# Patient Record
Sex: FEMALE | Race: WHITE | NOT HISPANIC OR LATINO | Employment: UNEMPLOYED | URBAN - METROPOLITAN AREA
[De-identification: names, ages, dates, MRNs, and addresses within clinical notes are randomized per-mention and may not be internally consistent; named-entity substitution may affect disease eponyms.]

---

## 2022-04-11 ENCOUNTER — HISTORICAL (OUTPATIENT)
Dept: ADMINISTRATIVE | Facility: HOSPITAL | Age: 27
End: 2022-04-11

## 2022-04-24 VITALS
BODY MASS INDEX: 43.04 KG/M2 | SYSTOLIC BLOOD PRESSURE: 110 MMHG | DIASTOLIC BLOOD PRESSURE: 74 MMHG | HEIGHT: 67 IN | WEIGHT: 274.25 LBS

## 2022-11-28 RX ORDER — VALACYCLOVIR HYDROCHLORIDE 1 G/1
1000 TABLET, FILM COATED ORAL 2 TIMES DAILY
COMMUNITY

## 2022-11-28 RX ORDER — SERTRALINE HYDROCHLORIDE 100 MG/1
100 TABLET, FILM COATED ORAL DAILY
COMMUNITY

## 2022-12-06 ENCOUNTER — HOSPITAL ENCOUNTER (OUTPATIENT)
Facility: HOSPITAL | Age: 27
Discharge: HOME | End: 2022-12-06
Attending: OBSTETRICS & GYNECOLOGY | Admitting: OBSTETRICS & GYNECOLOGY
Payer: MEDICAID

## 2022-12-06 ENCOUNTER — ANESTHESIA EVENT (OUTPATIENT)
Dept: SURGERY | Facility: HOSPITAL | Age: 27
End: 2022-12-06
Payer: MEDICAID

## 2022-12-06 ENCOUNTER — ANESTHESIA (OUTPATIENT)
Dept: SURGERY | Facility: HOSPITAL | Age: 27
End: 2022-12-06
Payer: MEDICAID

## 2022-12-06 DIAGNOSIS — Z30.2 STERILIZATION: Primary | ICD-10-CM

## 2022-12-06 LAB
B-HCG UR QL: NEGATIVE
CTP QC/QA: YES

## 2022-12-06 PROCEDURE — 00851 ANES IPER PX TUBAL LIGATION: CPT | Performed by: OBSTETRICS & GYNECOLOGY

## 2022-12-06 PROCEDURE — 63600175 PHARM REV CODE 636 W HCPCS: Performed by: ANESTHESIOLOGY

## 2022-12-06 PROCEDURE — 37000009 HC ANESTHESIA EA ADD 15 MINS: Performed by: OBSTETRICS & GYNECOLOGY

## 2022-12-06 PROCEDURE — 81025 URINE PREGNANCY TEST: CPT | Performed by: OBSTETRICS & GYNECOLOGY

## 2022-12-06 PROCEDURE — 63600175 PHARM REV CODE 636 W HCPCS: Performed by: NURSE ANESTHETIST, CERTIFIED REGISTERED

## 2022-12-06 PROCEDURE — 71000016 HC POSTOP RECOV ADDL HR: Performed by: OBSTETRICS & GYNECOLOGY

## 2022-12-06 PROCEDURE — 71000033 HC RECOVERY, INTIAL HOUR: Performed by: OBSTETRICS & GYNECOLOGY

## 2022-12-06 PROCEDURE — 37000008 HC ANESTHESIA 1ST 15 MINUTES: Performed by: OBSTETRICS & GYNECOLOGY

## 2022-12-06 PROCEDURE — 63600175 PHARM REV CODE 636 W HCPCS: Performed by: OBSTETRICS & GYNECOLOGY

## 2022-12-06 PROCEDURE — 36000708 HC OR TIME LEV III 1ST 15 MIN: Performed by: OBSTETRICS & GYNECOLOGY

## 2022-12-06 PROCEDURE — 71000039 HC RECOVERY, EACH ADD'L HOUR: Performed by: OBSTETRICS & GYNECOLOGY

## 2022-12-06 PROCEDURE — 25000003 PHARM REV CODE 250: Performed by: OBSTETRICS & GYNECOLOGY

## 2022-12-06 PROCEDURE — 63600175 PHARM REV CODE 636 W HCPCS

## 2022-12-06 PROCEDURE — 36000709 HC OR TIME LEV III EA ADD 15 MIN: Performed by: OBSTETRICS & GYNECOLOGY

## 2022-12-06 PROCEDURE — 25000003 PHARM REV CODE 250: Performed by: NURSE ANESTHETIST, CERTIFIED REGISTERED

## 2022-12-06 PROCEDURE — 71000015 HC POSTOP RECOV 1ST HR: Performed by: OBSTETRICS & GYNECOLOGY

## 2022-12-06 RX ORDER — PROPOFOL 10 MG/ML
VIAL (ML) INTRAVENOUS
Status: DISCONTINUED | OUTPATIENT
Start: 2022-12-06 | End: 2022-12-06

## 2022-12-06 RX ORDER — LIDOCAINE HYDROCHLORIDE 10 MG/ML
1 INJECTION, SOLUTION EPIDURAL; INFILTRATION; INTRACAUDAL; PERINEURAL ONCE
Status: DISCONTINUED | OUTPATIENT
Start: 2022-12-06 | End: 2022-12-06 | Stop reason: HOSPADM

## 2022-12-06 RX ORDER — ONDANSETRON HYDROCHLORIDE 2 MG/ML
INJECTION, SOLUTION INTRAMUSCULAR; INTRAVENOUS
Status: DISCONTINUED | OUTPATIENT
Start: 2022-12-06 | End: 2022-12-06

## 2022-12-06 RX ORDER — HYDROMORPHONE HYDROCHLORIDE 2 MG/ML
0.4 INJECTION, SOLUTION INTRAMUSCULAR; INTRAVENOUS; SUBCUTANEOUS EVERY 5 MIN PRN
Status: DISCONTINUED | OUTPATIENT
Start: 2022-12-06 | End: 2022-12-06 | Stop reason: HOSPADM

## 2022-12-06 RX ORDER — ROCURONIUM BROMIDE 10 MG/ML
INJECTION, SOLUTION INTRAVENOUS
Status: DISCONTINUED | OUTPATIENT
Start: 2022-12-06 | End: 2022-12-06

## 2022-12-06 RX ORDER — SCOLOPAMINE TRANSDERMAL SYSTEM 1 MG/1
1 PATCH, EXTENDED RELEASE TRANSDERMAL ONCE
Status: DISCONTINUED | OUTPATIENT
Start: 2022-12-06 | End: 2022-12-06 | Stop reason: HOSPADM

## 2022-12-06 RX ORDER — MIDAZOLAM HYDROCHLORIDE 1 MG/ML
INJECTION INTRAMUSCULAR; INTRAVENOUS
Status: COMPLETED
Start: 2022-12-06 | End: 2022-12-06

## 2022-12-06 RX ORDER — IPRATROPIUM BROMIDE AND ALBUTEROL SULFATE 2.5; .5 MG/3ML; MG/3ML
3 SOLUTION RESPIRATORY (INHALATION) ONCE AS NEEDED
Status: DISCONTINUED | OUTPATIENT
Start: 2022-12-06 | End: 2022-12-06 | Stop reason: HOSPADM

## 2022-12-06 RX ORDER — SODIUM CHLORIDE, SODIUM LACTATE, POTASSIUM CHLORIDE, CALCIUM CHLORIDE 600; 310; 30; 20 MG/100ML; MG/100ML; MG/100ML; MG/100ML
1000 INJECTION, SOLUTION INTRAVENOUS CONTINUOUS
Status: DISCONTINUED | OUTPATIENT
Start: 2022-12-06 | End: 2022-12-06 | Stop reason: HOSPADM

## 2022-12-06 RX ORDER — ONDANSETRON 4 MG/1
4 TABLET, ORALLY DISINTEGRATING ORAL EVERY 6 HOURS PRN
Status: DISCONTINUED | OUTPATIENT
Start: 2022-12-06 | End: 2022-12-06 | Stop reason: HOSPADM

## 2022-12-06 RX ORDER — MIDAZOLAM HYDROCHLORIDE 1 MG/ML
2 INJECTION INTRAMUSCULAR; INTRAVENOUS ONCE AS NEEDED
Status: COMPLETED | OUTPATIENT
Start: 2022-12-06 | End: 2022-12-06

## 2022-12-06 RX ORDER — BUPIVACAINE HYDROCHLORIDE AND EPINEPHRINE 5; 5 MG/ML; UG/ML
INJECTION, SOLUTION EPIDURAL; INTRACAUDAL; PERINEURAL
Status: DISCONTINUED | OUTPATIENT
Start: 2022-12-06 | End: 2022-12-06 | Stop reason: HOSPADM

## 2022-12-06 RX ORDER — FENTANYL CITRATE 50 UG/ML
INJECTION, SOLUTION INTRAMUSCULAR; INTRAVENOUS
Status: DISCONTINUED | OUTPATIENT
Start: 2022-12-06 | End: 2022-12-06

## 2022-12-06 RX ORDER — DEXAMETHASONE SODIUM PHOSPHATE 4 MG/ML
INJECTION, SOLUTION INTRA-ARTICULAR; INTRALESIONAL; INTRAMUSCULAR; INTRAVENOUS; SOFT TISSUE
Status: DISCONTINUED | OUTPATIENT
Start: 2022-12-06 | End: 2022-12-06

## 2022-12-06 RX ORDER — ACETAMINOPHEN 10 MG/ML
INJECTION, SOLUTION INTRAVENOUS
Status: DISCONTINUED | OUTPATIENT
Start: 2022-12-06 | End: 2022-12-06

## 2022-12-06 RX ORDER — HYDROMORPHONE HYDROCHLORIDE 2 MG/ML
0.2 INJECTION, SOLUTION INTRAMUSCULAR; INTRAVENOUS; SUBCUTANEOUS EVERY 5 MIN PRN
Status: DISCONTINUED | OUTPATIENT
Start: 2022-12-06 | End: 2022-12-06

## 2022-12-06 RX ORDER — MEPERIDINE HYDROCHLORIDE 25 MG/ML
12.5 INJECTION INTRAMUSCULAR; INTRAVENOUS; SUBCUTANEOUS ONCE AS NEEDED
Status: DISCONTINUED | OUTPATIENT
Start: 2022-12-06 | End: 2022-12-06 | Stop reason: HOSPADM

## 2022-12-06 RX ORDER — CIPROFLOXACIN 2 MG/ML
400 INJECTION, SOLUTION INTRAVENOUS
Status: COMPLETED | OUTPATIENT
Start: 2022-12-06 | End: 2022-12-06

## 2022-12-06 RX ORDER — ACETAMINOPHEN 10 MG/ML
1000 INJECTION, SOLUTION INTRAVENOUS ONCE
Status: DISCONTINUED | OUTPATIENT
Start: 2022-12-06 | End: 2022-12-06 | Stop reason: HOSPADM

## 2022-12-06 RX ORDER — SODIUM CITRATE AND CITRIC ACID MONOHYDRATE 334; 500 MG/5ML; MG/5ML
30 SOLUTION ORAL ONCE
Status: DISCONTINUED | OUTPATIENT
Start: 2022-12-06 | End: 2022-12-06 | Stop reason: HOSPADM

## 2022-12-06 RX ORDER — BUPIVACAINE HYDROCHLORIDE AND EPINEPHRINE 5; 5 MG/ML; UG/ML
INJECTION, SOLUTION EPIDURAL; INTRACAUDAL; PERINEURAL
Status: DISCONTINUED
Start: 2022-12-06 | End: 2022-12-06 | Stop reason: HOSPADM

## 2022-12-06 RX ORDER — CETIRIZINE HYDROCHLORIDE 10 MG/1
10 TABLET ORAL DAILY
COMMUNITY

## 2022-12-06 RX ORDER — METHOCARBAMOL 100 MG/ML
1000 INJECTION, SOLUTION INTRAMUSCULAR; INTRAVENOUS ONCE
Status: COMPLETED | OUTPATIENT
Start: 2022-12-06 | End: 2022-12-06

## 2022-12-06 RX ORDER — LIDOCAINE HYDROCHLORIDE 10 MG/ML
INJECTION, SOLUTION EPIDURAL; INFILTRATION; INTRACAUDAL; PERINEURAL
Status: DISCONTINUED | OUTPATIENT
Start: 2022-12-06 | End: 2022-12-06

## 2022-12-06 RX ADMIN — CIPROFLOXACIN 400 MG: 2 INJECTION, SOLUTION INTRAVENOUS at 06:12

## 2022-12-06 RX ADMIN — PROPOFOL 50 MG: 10 INJECTION, EMULSION INTRAVENOUS at 07:12

## 2022-12-06 RX ADMIN — SODIUM CHLORIDE, POTASSIUM CHLORIDE, SODIUM LACTATE AND CALCIUM CHLORIDE: 600; 310; 30; 20 INJECTION, SOLUTION INTRAVENOUS at 06:12

## 2022-12-06 RX ADMIN — ONDANSETRON 4 MG: 2 INJECTION INTRAMUSCULAR; INTRAVENOUS at 07:12

## 2022-12-06 RX ADMIN — LIDOCAINE HYDROCHLORIDE 30 MG: 10 INJECTION, SOLUTION EPIDURAL; INFILTRATION; INTRACAUDAL; PERINEURAL at 06:12

## 2022-12-06 RX ADMIN — MIDAZOLAM HYDROCHLORIDE 2 MG: 1 INJECTION INTRAMUSCULAR; INTRAVENOUS at 06:12

## 2022-12-06 RX ADMIN — MIDAZOLAM HYDROCHLORIDE 2 MG: 1 INJECTION, SOLUTION INTRAMUSCULAR; INTRAVENOUS at 06:12

## 2022-12-06 RX ADMIN — ACETAMINOPHEN 1000 MG: 10 INJECTION, SOLUTION INTRAVENOUS at 07:12

## 2022-12-06 RX ADMIN — METHOCARBAMOL 1000 MG: 100 INJECTION, SOLUTION INTRAMUSCULAR; INTRAVENOUS at 08:12

## 2022-12-06 RX ADMIN — DEXAMETHASONE SODIUM PHOSPHATE 8 MG: 4 INJECTION, SOLUTION INTRA-ARTICULAR; INTRALESIONAL; INTRAMUSCULAR; INTRAVENOUS; SOFT TISSUE at 07:12

## 2022-12-06 RX ADMIN — SODIUM CHLORIDE, POTASSIUM CHLORIDE, SODIUM LACTATE AND CALCIUM CHLORIDE 1000 ML: 600; 310; 30; 20 INJECTION, SOLUTION INTRAVENOUS at 08:12

## 2022-12-06 RX ADMIN — HYDROMORPHONE HYDROCHLORIDE 0.4 MG: 2 INJECTION INTRAMUSCULAR; INTRAVENOUS; SUBCUTANEOUS at 08:12

## 2022-12-06 RX ADMIN — FENTANYL CITRATE 50 MCG: 50 INJECTION, SOLUTION INTRAMUSCULAR; INTRAVENOUS at 07:12

## 2022-12-06 RX ADMIN — ROCURONIUM BROMIDE 20 MG: 50 INJECTION INTRAVENOUS at 07:12

## 2022-12-06 RX ADMIN — ROCURONIUM BROMIDE 50 MG: 50 INJECTION INTRAVENOUS at 06:12

## 2022-12-06 RX ADMIN — PROPOFOL 150 MG: 10 INJECTION, EMULSION INTRAVENOUS at 06:12

## 2022-12-06 RX ADMIN — FENTANYL CITRATE 50 MCG: 50 INJECTION, SOLUTION INTRAMUSCULAR; INTRAVENOUS at 06:12

## 2022-12-06 NOTE — ANESTHESIA POSTPROCEDURE EVALUATION
Anesthesia Post Evaluation    Patient: Cristy Hirsch    Procedure(s) Performed: Procedure(s) (LRB):  LIGATION, FALLOPIAN TUBE, LAPAROSCOPIC Bilateral (Bilateral)    Final Anesthesia Type: general (/Regional//MAC)      Patient location during evaluation: PACU  Post-procedure mental status: @ basline.  Post-procedure vital signs: reviewed and stable  Pain management: adequate    PONV status: See postop meds for drugs used to control n/v if any.  Anesthetic complications: no      Cardiovascular status: blood pressure returned to baseline  Respiratory status: @ baseline.  Hydration status: euvolemic            Vitals Value Taken Time   /94 12/06/22 0845   Temp 36.2 °C (97.2 °F) 12/06/22 0803   Pulse 61 12/06/22 0850   Resp 12 12/06/22 0850   SpO2 93 % 12/06/22 0850   Vitals shown include unvalidated device data.      No case tracking events are documented in the log.      Pain/Amrit Score: Pain Rating Prior to Med Admin: 7 (12/6/2022  8:30 AM)  Amrit Score: 9 (12/6/2022  8:45 AM)

## 2022-12-06 NOTE — ANESTHESIA PREPROCEDURE EVALUATION
"                                                                                                             12/06/2022  Cristy Hircsh is a 27 y.o., female.      Pre-op Assessment    I have reviewed the Patient Summary Reports.     I have reviewed the Nursing Notes. I have reviewed the NPO Status.   I have reviewed the Medications.     Review of Systems      Physical Exam  General: Well nourished and Cooperative    Airway:  Mallampati: II   Mouth Opening: Normal  TM Distance: Normal  Tongue: Normal  Neck ROM: Normal ROM    Dental:  Intact    Chest/Lungs:  Clear to auscultation    Heart:  Rate: Normal        Anesthesia Plan  Type of Anesthesia, risks & benefits discussed:    Anesthesia Type: Gen ETT  Intra-op Monitoring Plan: Standard ASA Monitors  Post Op Pain Control Plan: multimodal analgesia  Induction:  IV  Informed Consent: Informed consent signed with the Patient and all parties understand the risks and agree with anesthesia plan.  All questions answered.   ASA Score: 1  Day of Surgery Review of History & Physical: H&P Update referred to the surgeon/provider.    Ready For Surgery From Anesthesia Perspective.     .   I explained anesthesia plan to patient/responsbile party if available.  Anesthesia consent done going over the material facts, risks, complications & alternatives, obtained which includes the possibility of altering the anesthesia plan.  I reviewed problem list, appropriate labs, any workup, Xray, EKG etc noted below.  Patients condition is satisfactory to proceed with anesthesia plan unless otherwise noted (see anesthesia chart for details of the anesthesia plan carried out).      Pre-operative evaluation for Procedure(s) (LRB):  LIGATION, FALLOPIAN TUBE, LAPAROSCOPIC Bilateral (Bilateral)    /74   Pulse 72   Temp 36.8 °C (98.3 °F) (Oral)   Resp 18   Ht 5' 7" (1.702 m)   Wt 133.9 kg (295 lb 3.1 oz)   LMP 11/02/2022 (Approximate)   SpO2 96%   Breastfeeding Yes   BMI 46.23 kg/m² "     Patient Active Problem List   Diagnosis    Sterilization       Review of patient's allergies indicates:   Allergen Reactions    Augmentin [amoxicillin-pot clavulanate] Rash       Current Outpatient Medications   Medication Instructions    cetirizine (ZYRTEC) 10 mg, Oral, Daily    Lactobacillus rhamnosus GG (CULTURELLE) 10 billion cell capsule 1 capsule, Oral, Daily    prenatal 25/iron fum/folic/dha (PRENATAL-1 ORAL) Oral    sertraline (ZOLOFT) 100 mg, Oral, Daily    valACYclovir (VALTREX) 1,000 mg, Oral, 2 times daily       Past Surgical History:   Procedure Laterality Date    TONSILLECTOMY         Social History     Socioeconomic History    Marital status: Single   Tobacco Use    Smoking status: Never    Smokeless tobacco: Never   Substance and Sexual Activity    Alcohol use: Not Currently    Drug use: Never    Sexual activity: Not Currently       Lab Results   Component Value Date    WBC 7.6 03/03/2019    HGB 12.9 03/03/2019    HCT 42.0 03/03/2019    MCV 89.9 03/03/2019     03/03/2019          BMP  Lab Results   Component Value Date    HCT 42.0 03/03/2019     03/03/2019    K 4.0 03/03/2019    BUN 8.0 03/03/2019    CREATININE 0.82 03/03/2019    CALCIUM 8.1 (L) 03/03/2019        INR  No results for input(s): PT, INR, PROTIME, APTT in the last 72 hours.        Diagnostic Studies:      EKG:  No results found for this or any previous visit.

## 2022-12-06 NOTE — OP NOTE
Operative Report :     Patient Name: Cristy Hirsch  MRN:  [id number]  : 1995    Date of Surgery: 2022    Preop Diagnosis: Undesired Fertility    Postop Diagnosis: Same    Procedure:  Laparoscopic Bilateral Tubal Fulgeration    Surgeon: Rudy Archuleta MD    Assistant: none    Anesthesia: General    EBL: minimal cc     Urine output: 200 cc    IVF: 700 cc    Specimen: N/A    Complications: None    Findings: Normal appearing uterus and bilateral tubes & ovaries    Disposition: hemodynamically stable, transferred to PACU    Indications: Cristy Hirsch is a 27 y.o. presents today for the above mentioned procedured. She is 100% of undesired future fertility. The patient was counseled on the procedure in detail along with the risk of the procedure, as well as given alternative options for birth control measures. She voiced her understanding and consented to the procedure at her pre-operative appointment.     Procedure: The patient was taken to the OR and placed in the dorsal supine position where general anesthesia was obtained without difficulty. She was placed in the dorsal lithotomy position with the legs supported using Robert stirrups. She was then prepped and draped in the normal sterile fashion.  Approximately 200cc of clear yellow urine was drained from the patient's bladder prior to the start of the case. A time-out was performed to identify the correct patient and correct procedure.  No preoperative prophylactic antibiotic coverage was needed for this type of procedure.  A speculum was placed into the vagina.  A single tooth tenaculum was used to grasp the anterior lip of the cervix. The uterus sounded to 7 cm. The cervical os did not require dilation to accommodate the uterine manipulator. An acorn tip manipulator was placed without difficulty. The tenaculum was removed. Attention was then turned to the laparoscopic portion of the procedure. Marcaine was injected into the base of the umbilicus. A scalpel  was used to make an incision at the base of the umbilicus. The Veress needle was introduced into the abdomen which was confirmed with saline drop test. Intraabdominal placement was further confirmed with low opening pressure of CO2 gas. The abdomen was distended with CO2 to a pressure of 15mmHg. A 5 mm trocar was inserted under direct visualization with manual elevation of the skin.  Patient was positioned in Trendelenburg with the above mentioned findings as noted above. A 5mm suprapubic trocar was placed under direct visualization with the camera without difficulty.  The left fallopian tube was identified and followed out distally until the fimbriated end was visualized.  Approximately 3-4 cm segment of the left fallopian tube was successfully cauterized using the bipolar Kleppinger device.   Hemostasis was noted.  Attention was then turned to the patient's right fallopian tube and in a similar fashion a 3-4 cm segment of the right fallopian tube was successfully cauterized using the bipolar Kleppinger device.  Hemostasis was noted.  All instruments removed from the patient's abdomen under direct visualization.  Pneumoperitoneum was then evacuated and the trocars were removed under direct visualization.  Skin was closed using monocryl in a subcuticular fashion. The manipulator was removed. The patient tolerated the procedure well. All sponge, lap, instrument, and needle counts were correct times two. The patient was awakened from general anesthesia and taken to the recovery room in stable condition.    Patient to be discharged in stable condition when she meets discharge criteria.

## 2022-12-06 NOTE — TRANSFER OF CARE
"Anesthesia Transfer of Care Note    Patient: Cristy Hirsch    Procedure(s) Performed: Procedure(s) (LRB):  LIGATION, FALLOPIAN TUBE, LAPAROSCOPIC Bilateral (Bilateral)    Patient location: PACU    Anesthesia Type: general    Transport from OR: Transported from OR on room air with adequate spontaneous ventilation    Post pain: adequate analgesia    Post assessment: no apparent anesthetic complications    Post vital signs: stable    Level of consciousness: responds to stimulation    Nausea/Vomiting: no nausea/vomiting    Complications: none    Transfer of care protocol was followed      Last vitals:   Visit Vitals  BP (!) 157/76   Pulse 78   Temp 36.2 °C (97.2 °F)   Resp 15   Ht 5' 7" (1.702 m)   Wt 133.9 kg (295 lb 3.1 oz)   LMP 11/02/2022 (Approximate)   SpO2 96%   Breastfeeding Yes   BMI 46.23 kg/m²     "

## 2022-12-06 NOTE — DISCHARGE INSTRUCTIONS
Patient Education        Fallopian Tube Removal Discharge Instructions     Why is this procedure done?   You have 2 fallopian tubes. Eggs from your ovaries travel down the tube to reach your womb or uterus. A fallopian tube removal surgery, or salpingectomy, removes one or both of your fallopian tubes. With this surgery, your ovaries and uterus are left in place.  This surgery may be done:  To prevent cancer of the fallopian tube or ovary  To prevent pregnancy  After an ectopic pregnancy  If a fallopian tube is blocked or scarred  If you have endometriosis      What care is needed at home?   You have dermabond (skin glue). You may wash your incision with soap and water. Be sure to wash your hands before and after touching your wound or dressing. The dermabond may peel off within 1-2 weeks. You do not have to peel or pick at it.  You may take showers 24 hours after your surgery. You should wash between your legs with soap and water very well to reduce your chance of infection.   Do not lift anything over 10 pounds (4.5 kg). Avoid activities like heavy lifting and hard exercise.   Do not use tampons, douche, or have sex for 2 weeks following your surgery or until told so by your doctor.  You can expect some bleeding from your vagina for a few weeks. You may use sanitary pads but not tampons.  Your bowel movements may take some time to get back to normal. Eat small meals high in fiber to avoid hard stools. Drink 6 to 8 glasses of water each day.  Try to walk each day. Start by walking a little more than you did the day before. Walking boosts blood flow and helps prevent lung, belly, and blood problems.  Talk with your doctor about safe sex as you can still be exposed to sexually transmitted diseases.  Use a small pillow to put pressure on your belly. The pressure can make you more comfortable when you cough, laugh, or do other actions.     What follow-up care is needed?   Be sure to keep your follow up visit.     Will  physical activity be limited?   Rest for the first few days after the procedure. Talk to your doctor about the right amount of activity for you.    What problems could happen?   Infertility if both fallopian tubes were removed  Infection  Wound opening  Bleeding  Blood clots in your legs or lungs  Injury to nearby organs  Risk for an ectopic pregnancy    When do I need to call the doctor?   Signs of infection such as a fever of 100.4°F (38°C) or higher, chills, pain with passing urine, wound that will not heal, or anal itching  Signs of wound infection such as swelling, redness, warmth around the wound; too much pain when touched; yellowish, greenish, or bloody discharge; foul smell coming from the cut site; cut site opens up  Lots of blood in your sanitary pads or more than 6 soaked pads per day  Upset stomach, throwing up, or very bad belly pain  No bowel movement after 3 days  You feel the need to pass urine but it will not come out even after 6 hours  Smelly, green, or dark yellow vaginal discharge  Feeling short of breath  Pain or swelling in one or both legs

## 2022-12-06 NOTE — ANESTHESIA PROCEDURE NOTES
Intubation    Date/Time: 12/6/2022 7:05 AM  Performed by: Vanessa Pineda CRNA  Authorized by: Jose Luis De Los Santos MD     Intubation:     Induction:  Intravenous    Intubated:  Postinduction    Mask Ventilation:  Easy with oral airway    Attempts:  1    Attempted By:  CRNA    Method of Intubation:  Direct    Blade:  Julio 3    Laryngeal View Grade: Grade I - full view of cords      Difficult Airway Encountered?: No      Complications:  None    Airway Device:  Oral endotracheal tube    Airway Device Size:  7.0    Style/Cuff Inflation:  Cuffed (inflated to minimal occlusive pressure)    Tube secured:  21    Secured at:  The lips    Placement Verified By:  Capnometry    Complicating Factors:  None    Findings Post-Intubation:  BS equal bilateral

## 2022-12-06 NOTE — H&P
INTERVAL H&P:    Patient was seen and examined at bedside. No significant changes noted to the patient's medical condition since the H&P was performed. Please see pre-op H&P in the chart. Procedure and consents reviewed. All questions answered. Proceed to the OR when ready.

## 2022-12-08 VITALS
WEIGHT: 293 LBS | SYSTOLIC BLOOD PRESSURE: 144 MMHG | OXYGEN SATURATION: 98 % | HEIGHT: 67 IN | RESPIRATION RATE: 14 BRPM | HEART RATE: 61 BPM | TEMPERATURE: 97 F | BODY MASS INDEX: 45.99 KG/M2 | DIASTOLIC BLOOD PRESSURE: 91 MMHG

## (undated) DEVICE — SOL ELECTROLUBE ANTI-STIC

## (undated) DEVICE — GLOVE PROTEXIS PI SYN SURG 8.0

## (undated) DEVICE — COVER PROXIMA MAYO STAND

## (undated) DEVICE — GOWN SMARTSLEEVE XXL/XLONG

## (undated) DEVICE — LUBRICANT SURGILUBE 2 OZ

## (undated) DEVICE — Device

## (undated) DEVICE — TROCAR ENDOPATH XCEL 5X100MM

## (undated) DEVICE — SUPPORT ULNA NERVE PROTECTOR

## (undated) DEVICE — ADHESIVE DERMABOND ADVANCED

## (undated) DEVICE — GLOVE PROTEXIS PI SYN SURG 6.0

## (undated) DEVICE — GLOVE PROTEXIS LTX MICRO 6.5

## (undated) DEVICE — SUT MCRYL PLUS 4-0 PS2 27IN

## (undated) DEVICE — GLOVE PROTEXIS BLUE LATEX 6.5

## (undated) DEVICE — SEE MEDLINE ITEM 154981

## (undated) DEVICE — NDL INSUF ULTRA VERESS 120MM

## (undated) DEVICE — GLOVE PROTEXIS HYDROGEL SZ7.5

## (undated) DEVICE — CANNULA ENDOPATH XCEL 5X100MM

## (undated) DEVICE — ELECTRODE PATIENT RETURN DISP

## (undated) DEVICE — TROCAR KII FIOS 5MM X 100MM

## (undated) DEVICE — DRAPE UTILITY W/ TAPE 20X30IN

## (undated) DEVICE — BLANKET SNUGGLE WARM ADLT SM

## (undated) DEVICE — NDL SAFETY 25G X 1.5 ECLIPSE

## (undated) DEVICE — SOL IRRI STRL WATER 1000ML

## (undated) DEVICE — PACK GYN LAPAROSCOPY HZD